# Patient Record
Sex: FEMALE | Race: ASIAN | NOT HISPANIC OR LATINO
[De-identification: names, ages, dates, MRNs, and addresses within clinical notes are randomized per-mention and may not be internally consistent; named-entity substitution may affect disease eponyms.]

---

## 2023-05-10 PROBLEM — Z00.00 ENCOUNTER FOR PREVENTIVE HEALTH EXAMINATION: Status: ACTIVE | Noted: 2023-05-10

## 2023-05-11 ENCOUNTER — NON-APPOINTMENT (OUTPATIENT)
Age: 53
End: 2023-05-11

## 2023-05-11 ENCOUNTER — APPOINTMENT (OUTPATIENT)
Dept: RHEUMATOLOGY | Facility: CLINIC | Age: 53
End: 2023-05-11
Payer: COMMERCIAL

## 2023-05-11 VITALS
HEART RATE: 102 BPM | WEIGHT: 139 LBS | OXYGEN SATURATION: 94 % | BODY MASS INDEX: 25.58 KG/M2 | HEIGHT: 62 IN | SYSTOLIC BLOOD PRESSURE: 114 MMHG | DIASTOLIC BLOOD PRESSURE: 75 MMHG

## 2023-05-11 DIAGNOSIS — H04.123 DRY EYE SYNDROME OF BILATERAL LACRIMAL GLANDS: ICD-10-CM

## 2023-05-11 DIAGNOSIS — Z78.9 OTHER SPECIFIED HEALTH STATUS: ICD-10-CM

## 2023-05-11 PROCEDURE — 99204 OFFICE O/P NEW MOD 45 MIN: CPT

## 2023-05-11 NOTE — HISTORY OF PRESENT ILLNESS
[FreeTextEntry1] : She reports pain in her back, left lower back, buttock, left hip, left lower abdomen. When she eats more food she feels more pain in her left lower abdomen. She cannot sit for long periods of time due to back and buttock pain and gets numbness and tingling, and when she gets up she has be get up slowly at first due to pain in her back and hip, and then after a few steps her symptoms alleviate. She has been taking meloxicam by her PCP that gives her temporary relief. Her biggest concern is her coccyx pain. When she walks a lot she feels pain in the bottom of her feet for many years and she received steroid injections in the past for plantar fasciitis. She was diagnosed with tennis elbow and trigger finger and is s/p steroid injection with orthopedic surgery. She is following orthopedic surgery and pain management for her back, hip pains, and elbow pains. She ad MRI of the pelvis, L hip, lumbar spine, sacrum performed.\par \par -Dry mouth, severe for the past 10 years\par -Dry eyes, improved\par -Feels itchy and bumps with eating almonds but better with soaked almonds but she stopped this due to concern of left lower abdominal pain. \par \par Denies fatigue, fevers, muscle aches or myalgias, weight loss, night sweats, rash, photosensitivity, joint stiffness, joint swelling,  raynaud's, +dysphagia with water since childhood, odynophagia, oral ulcers, nasal ulcers, +hair loss, sinus problems, nosebleeds, +visual disturbances, history of VTE, history of miscarriage x 1 at 5-6 weeks no heart beat was detected, history of serositis \par \par Maternal cousins daughter has Lupus

## 2023-05-11 NOTE — PHYSICAL EXAM
[General Appearance - Alert] : alert [General Appearance - In No Acute Distress] : in no acute distress [Sclera] : the sclera and conjunctiva were normal [PERRL With Normal Accommodation] : pupils were equal in size, round, and reactive to light [Extraocular Movements] : extraocular movements were intact [Outer Ear] : the ears and nose were normal in appearance [Oropharynx] : the oropharynx was normal [Neck Appearance] : the appearance of the neck was normal [Neck Cervical Mass (___cm)] : no neck mass was observed [Jugular Venous Distention Increased] : there was no jugular-venous distention [Thyroid Diffuse Enlargement] : the thyroid was not enlarged [Thyroid Nodule] : there were no palpable thyroid nodules [Auscultation Breath Sounds / Voice Sounds] : lungs were clear to auscultation bilaterally [Heart Rate And Rhythm] : heart rate was normal and rhythm regular [Heart Sounds] : normal S1 and S2 [Heart Sounds Gallop] : no gallops [Murmurs] : no murmurs [Heart Sounds Pericardial Friction Rub] : no pericardial rub [Bowel Sounds] : normal bowel sounds [Abdomen Soft] : soft [Abdomen Tenderness] : non-tender [Abdomen Mass (___ Cm)] : no abdominal mass palpated [Abnormal Walk] : normal gait [Nail Clubbing] : no clubbing  or cyanosis of the fingernails [Musculoskeletal - Swelling] : no joint swelling seen [Motor Tone] : muscle strength and tone were normal [Skin Color & Pigmentation] : normal skin color and pigmentation [Skin Turgor] : normal skin turgor [] : no rash [Sensation] : the sensory exam was normal to light touch and pinprick [Motor Exam] : the motor exam was normal [Affect] : the affect was normal [Mood] : the mood was normal

## 2023-05-11 NOTE — ASSESSMENT
[FreeTextEntry1] : Dry eyes and dry mouth\par Low back pain and left hip pain\par -Patient has symptoms of possible sjogrens with dry eyes and dry mouth, otherwise no other clear signs or symptoms to suggest and underlying autoimmune disease today on history or physical exam. Will complete full serologic work up for autoimmune disease today \par

## 2023-05-11 NOTE — REVIEW OF SYSTEMS
[Shortness Of Breath] : shortness of breath [Cough] : cough [Negative] : Neurological [de-identified] : N